# Patient Record
Sex: FEMALE | Race: BLACK OR AFRICAN AMERICAN | NOT HISPANIC OR LATINO
[De-identification: names, ages, dates, MRNs, and addresses within clinical notes are randomized per-mention and may not be internally consistent; named-entity substitution may affect disease eponyms.]

---

## 2020-08-18 ENCOUNTER — APPOINTMENT (OUTPATIENT)
Dept: OTOLARYNGOLOGY | Facility: CLINIC | Age: 20
End: 2020-08-18

## 2020-08-18 PROBLEM — Z00.00 ENCOUNTER FOR PREVENTIVE HEALTH EXAMINATION: Status: ACTIVE | Noted: 2020-08-18

## 2023-12-18 ENCOUNTER — EMERGENCY (EMERGENCY)
Facility: HOSPITAL | Age: 23
LOS: 0 days | Discharge: HOME | End: 2023-12-19
Attending: EMERGENCY MEDICINE
Payer: MEDICAID

## 2023-12-18 VITALS
DIASTOLIC BLOOD PRESSURE: 85 MMHG | RESPIRATION RATE: 18 BRPM | HEIGHT: 65 IN | SYSTOLIC BLOOD PRESSURE: 145 MMHG | HEART RATE: 72 BPM | WEIGHT: 259.93 LBS | OXYGEN SATURATION: 98 % | TEMPERATURE: 98 F

## 2023-12-18 VITALS
DIASTOLIC BLOOD PRESSURE: 85 MMHG | HEART RATE: 64 BPM | OXYGEN SATURATION: 100 % | TEMPERATURE: 98 F | RESPIRATION RATE: 18 BRPM | SYSTOLIC BLOOD PRESSURE: 145 MMHG

## 2023-12-18 DIAGNOSIS — E78.5 HYPERLIPIDEMIA, UNSPECIFIED: ICD-10-CM

## 2023-12-18 DIAGNOSIS — R60.0 LOCALIZED EDEMA: ICD-10-CM

## 2023-12-18 DIAGNOSIS — M79.89 OTHER SPECIFIED SOFT TISSUE DISORDERS: ICD-10-CM

## 2023-12-18 DIAGNOSIS — E11.9 TYPE 2 DIABETES MELLITUS WITHOUT COMPLICATIONS: ICD-10-CM

## 2023-12-18 DIAGNOSIS — E73.9 LACTOSE INTOLERANCE, UNSPECIFIED: ICD-10-CM

## 2023-12-18 DIAGNOSIS — J45.909 UNSPECIFIED ASTHMA, UNCOMPLICATED: ICD-10-CM

## 2023-12-18 DIAGNOSIS — Z91.010 ALLERGY TO PEANUTS: ICD-10-CM

## 2023-12-18 DIAGNOSIS — Z79.4 LONG TERM (CURRENT) USE OF INSULIN: ICD-10-CM

## 2023-12-18 DIAGNOSIS — Z91.040 LATEX ALLERGY STATUS: ICD-10-CM

## 2023-12-18 LAB
HCG UR QL: NEGATIVE — SIGNIFICANT CHANGE UP
HCG UR QL: NEGATIVE — SIGNIFICANT CHANGE UP

## 2023-12-18 PROCEDURE — 93971 EXTREMITY STUDY: CPT | Mod: 26,LT

## 2023-12-18 PROCEDURE — 99285 EMERGENCY DEPT VISIT HI MDM: CPT

## 2023-12-18 PROCEDURE — 73080 X-RAY EXAM OF ELBOW: CPT | Mod: 26,LT

## 2023-12-18 RX ORDER — IBUPROFEN 200 MG
600 TABLET ORAL ONCE
Refills: 0 | Status: COMPLETED | OUTPATIENT
Start: 2023-12-18 | End: 2023-12-18

## 2023-12-18 RX ADMIN — Medication 600 MILLIGRAM(S): at 21:58

## 2023-12-18 RX ADMIN — Medication 600 MILLIGRAM(S): at 23:46

## 2023-12-18 NOTE — ED PROVIDER NOTE - CLINICAL SUMMARY MEDICAL DECISION MAKING FREE TEXT BOX
Pt with nonspecific swelling on left upper extremity, no sign of infection noted, xray and US noted without acute findings - will recommend PMD follow up and elevation and warm compress to area.

## 2023-12-18 NOTE — ED ADULT TRIAGE NOTE - CHIEF COMPLAINT QUOTE
pt c/o left arm pain and slight swelling for 2 days. denies redness or warmth at the site. denies injury, fever or chill. sent form urgent care to r/o infection.  history of dm.

## 2023-12-18 NOTE — ED PROVIDER NOTE - PATIENT PORTAL LINK FT
You can access the FollowMyHealth Patient Portal offered by [facility] by registering at the following website: [email or web address]. By joining [employer]’s FollowMyHealth portal, you will also be able to view your health information using other applications (apps) compatible with our system. You can access the FollowMyHealth Patient Portal offered by [facility] by registering at the following website: [email or web address]. By joining [email or web address]’s FollowMyHealth portal, you will also be able to view your health information using other applications (apps) compatible with our system.

## 2023-12-18 NOTE — ED PROVIDER NOTE - NS_EDPROVIDERDISPOUSERTYPE_ED_A_ED
Problem: Discharge Planning  Goal: Discharge to home or other facility with appropriate resources  Outcome: Progressing     Problem: Safety - Adult  Goal: Free from fall injury  Outcome: Progressing     Problem: Pain  Goal: Verbalizes/displays adequate comfort level or baseline comfort level  Outcome: Progressing  Flowsheets  Taken 7/22/2022 0000 by Misty Franklin RN  Verbalizes/displays adequate comfort level or baseline comfort level:   Encourage patient to monitor pain and request assistance   Assess pain using appropriate pain scale  Taken 7/21/2022 1945 by Misty Franklin RN  Verbalizes/displays adequate comfort level or baseline comfort level:   Encourage patient to monitor pain and request assistance   Assess pain using appropriate pain scale     Problem: Cardiovascular - Adult  Goal: Maintains optimal cardiac output and hemodynamic stability  Outcome: Progressing     Problem: Metabolic/Fluid and Electrolytes - Adult  Goal: Electrolytes maintained within normal limits  Outcome: Progressing  Goal: Hemodynamic stability and optimal renal function maintained  Outcome: Progressing  Goal: Glucose maintained within prescribed range  Outcome: Progressing I have personally evaluated and examined the patient. The Attending was available to me as a supervising provider if needed. Attending Attestation (For Attendings USE Only)...

## 2023-12-18 NOTE — ED PROVIDER NOTE - ATTENDING CONTRIBUTION TO CARE
Patient evaluated and seen with TIFFANIE Vazquez as a shared visit - agree with above history and physical - pt examined and seen by me personally - findings as seen: Pt with nonspecific left upper extremity swelling mildly tender/sensitive on exam without any redness or warmth, US noted no DVT and no visible pocket of fluid, likely area of swelling may be lipoma vs lymphedema - will recommend PMD follow up if not improved with warm compress and elevation.

## 2023-12-18 NOTE — ED PROVIDER NOTE - PHYSICAL EXAMINATION
PHYSICAL EXAM:    GENERAL: Alert, appears stated age, well appearing, non-toxic  SKIN: Warm, and dry. MMM.   HEAD: NC, AT   EYE: Normal lids/conjunctiva  ENT: Normal hearing, patent oropharynx   NECK: +supple. No meningismus  Pulm: Bilateral BS, normal resp effort, no wheezes, stridor, or retractions  CV: RRR, no M/R/G, 2+and = radial pulses  Mskel: no erythema, cyanosis. no calf tenderness. +L medial elbow/distal humerus area with mild swelling/ttp. no decreased rom.   Neuro: AAOx3, no sensory/motor deficits. 5/5 strength throughout. normal gait.

## 2023-12-18 NOTE — ED PROVIDER NOTE - NS ED ATTENDING STATEMENT MOD
I have seen and examined this patient and fully participated in the care of this patient as the teaching attending.  The service was shared with the ZHANNA.  I reviewed and verified the documentation and independently performed the documented:

## 2023-12-18 NOTE — ED ADULT NURSE NOTE - NSFALLUNIVINTERV_ED_ALL_ED
Bed/Stretcher in lowest position, wheels locked, appropriate side rails in place/Call bell, personal items and telephone in reach/Instruct patient to call for assistance before getting out of bed/chair/stretcher/Non-slip footwear applied when patient is off stretcher/[place] to call system/Physically safe environment - no spills, clutter or unnecessary equipment/Purposeful proactive rounding/Room/bathroom lighting operational, light cord in reach Bed/Stretcher in lowest position, wheels locked, appropriate side rails in place/Call bell, personal items and telephone in reach/Instruct patient to call for assistance before getting out of bed/chair/stretcher/Non-slip footwear applied when patient is off stretcher/[place] to call system/Physically safe environment - no spills, clutter or unnecessary equipment/Purposeful proactive rounding/Room/bathroom lighting operational, light cord in reach

## 2025-04-14 ENCOUNTER — DOCTOR'S OFFICE (OUTPATIENT)
Facility: LOCATION | Age: 25
Setting detail: OPHTHALMOLOGY
End: 2025-04-14
Payer: COMMERCIAL

## 2025-04-14 DIAGNOSIS — E11.9: ICD-10-CM

## 2025-04-14 DIAGNOSIS — H52.13: ICD-10-CM

## 2025-04-14 DIAGNOSIS — H52.7: ICD-10-CM

## 2025-04-14 DIAGNOSIS — H04.123: ICD-10-CM

## 2025-04-14 DIAGNOSIS — H01.001: ICD-10-CM

## 2025-04-14 DIAGNOSIS — H01.002: ICD-10-CM

## 2025-04-14 PROBLEM — H01.005 BLEPHARITIS; RIGHT UPPER LID, RIGHT LOWER LID, LEFT UPPER LID, LEFT LOWER LID: Status: ACTIVE | Noted: 2025-04-14

## 2025-04-14 PROBLEM — H01.004 BLEPHARITIS; RIGHT UPPER LID, RIGHT LOWER LID, LEFT UPPER LID, LEFT LOWER LID: Status: ACTIVE | Noted: 2025-04-14

## 2025-04-14 PROCEDURE — 92014 COMPRE OPH EXAM EST PT 1/>: CPT | Performed by: OPHTHALMOLOGY

## 2025-04-14 PROCEDURE — 92015 DETERMINE REFRACTIVE STATE: CPT | Performed by: OPHTHALMOLOGY

## 2025-04-14 ASSESSMENT — REFRACTION_AUTOREFRACTION
OS_SPHERE: -0.50
OD_CYLINDER: +0.75
OS_CYLINDER: +0.50
OD_AXIS: 017
OS_AXIS: 004
OD_SPHERE: -1.25

## 2025-04-14 ASSESSMENT — LID EXAM ASSESSMENTS
OD_EDEMA: RLL RUL
OD_BLEPHARITIS: RLL RUL
OS_BLEPHARITIS: LLL LUL

## 2025-04-14 ASSESSMENT — REFRACTION_MANIFEST
OS_AXIS: 015
OD_CYLINDER: +0.75
OD_AXIS: 015
OS_CYLINDER: +0.25
OS_SPHERE: -0.50
OS_VA1: 20/20
OD_SPHERE: -1.00
OD_VA1: 20/20

## 2025-04-14 ASSESSMENT — VISUAL ACUITY
OD_BCVA: 20/25+2
OS_BCVA: 20/25

## 2025-04-14 ASSESSMENT — KERATOMETRY
OD_K1POWER_DIOPTERS: 40.50
OD_AXISANGLE_DEGREES: 070
OD_K2POWER_DIOPTERS: 41.75
OS_AXISANGLE_DEGREES: 099
OS_K2POWER_DIOPTERS: 40.50
OS_K1POWER_DIOPTERS: 40.00

## 2025-04-14 ASSESSMENT — CONFRONTATIONAL VISUAL FIELD TEST (CVF)
OD_FINDINGS: FULL
OS_FINDINGS: FULL

## 2025-04-14 ASSESSMENT — TONOMETRY
OS_IOP_MMHG: 15
OD_IOP_MMHG: 15